# Patient Record
Sex: MALE | Race: WHITE | NOT HISPANIC OR LATINO
[De-identification: names, ages, dates, MRNs, and addresses within clinical notes are randomized per-mention and may not be internally consistent; named-entity substitution may affect disease eponyms.]

---

## 2021-05-27 ENCOUNTER — RECORDS - HEALTHEAST (OUTPATIENT)
Dept: ADMINISTRATIVE | Facility: OTHER | Age: 51
End: 2021-05-27

## 2021-05-27 ENCOUNTER — AMBULATORY - HEALTHEAST (OUTPATIENT)
Dept: SURGERY | Facility: HOSPITAL | Age: 51
End: 2021-05-27
Payer: COMMERCIAL

## 2021-05-27 ENCOUNTER — ANESTHESIA - HEALTHEAST (OUTPATIENT)
Dept: SURGERY | Facility: HOSPITAL | Age: 51
End: 2021-05-27

## 2021-05-27 ENCOUNTER — AMBULATORY - HEALTHEAST (OUTPATIENT)
Dept: LAB | Facility: CLINIC | Age: 51
End: 2021-05-27

## 2021-05-27 DIAGNOSIS — Z11.59 ENCOUNTER FOR SCREENING FOR OTHER VIRAL DISEASES: ICD-10-CM

## 2021-05-27 LAB
SARS-COV-2 PCR COMMENT: NORMAL
SARS-COV-2 RNA SPEC QL NAA+PROBE: NEGATIVE
SARS-COV-2 VIRUS SPECIMEN SOURCE: NORMAL

## 2021-05-27 ASSESSMENT — MIFFLIN-ST. JEOR: SCORE: 1713.08

## 2021-05-28 ENCOUNTER — RECORDS - HEALTHEAST (OUTPATIENT)
Dept: ADMINISTRATIVE | Facility: OTHER | Age: 51
End: 2021-05-28

## 2021-05-28 ENCOUNTER — COMMUNICATION - HEALTHEAST (OUTPATIENT)
Dept: SCHEDULING | Facility: CLINIC | Age: 51
End: 2021-05-28

## 2021-05-28 ENCOUNTER — SURGERY - HEALTHEAST (OUTPATIENT)
Dept: SURGERY | Facility: HOSPITAL | Age: 51
End: 2021-05-28
Payer: COMMERCIAL

## 2021-05-28 ASSESSMENT — MIFFLIN-ST. JEOR: SCORE: 1667.27

## 2021-05-30 ENCOUNTER — RECORDS - HEALTHEAST (OUTPATIENT)
Dept: ADMINISTRATIVE | Facility: CLINIC | Age: 51
End: 2021-05-30

## 2021-06-26 NOTE — ANESTHESIA PREPROCEDURE EVALUATION
Anesthesia Evaluation      Patient summary reviewed   No history of anesthetic complications     Airway   Mallampati: I  Neck ROM: full   Pulmonary - negative ROS and normal exam    breath sounds clear to auscultation                         Cardiovascular - negative ROS and normal exam  (-) murmur  Rhythm: regular  Rate: normal,    no murmur      Neuro/Psych - negative ROS     Endo/Other - negative ROS      GI/Hepatic/Renal - negative ROS           Dental - normal exam                        Anesthesia Plan  Planned anesthetic: MAC    ASA 1   Induction: intravenous   Anesthetic plan and risks discussed with: patient  Anesthesia plan special considerations: antiemetics,   Post-op plan: routine recovery

## 2021-06-26 NOTE — ANESTHESIA POSTPROCEDURE EVALUATION
Patient: Suhas Khan  Procedure(s):  INCARCERATED VENTRAL HERNIA  REPAIR  Anesthesia type: MAC    Patient location: Phase II Recovery  Last vitals:   Vitals Value Taken Time   /67 05/28/21 0830   Temp 36.5  C (97.7  F) 05/28/21 0759   Pulse 50 05/28/21 0836   Resp 16 05/28/21 0759   SpO2 99 % 05/28/21 0836   Vitals shown include unvalidated device data.  Post vital signs: stable  Level of consciousness: awake and responds to simple questions  Post-anesthesia pain: pain controlled  Post-anesthesia nausea and vomiting: no  Pulmonary: unassisted, return to baseline  Cardiovascular: stable and blood pressure at baseline  Hydration: adequate  Anesthetic events: no    QCDR Measures:  ASA# 11 - Kateryna-op Cardiac Arrest: ASA11B - Patient did NOT experience unanticipated cardiac arrest  ASA# 12 - Kateryna-op Mortality Rate: ASA12B - Patient did NOT die  ASA# 13 - PACU Re-Intubation Rate: NA - No ETT / LMA used for case  ASA# 10 - Composite Anes Safety: ASA10A - No serious adverse event    Additional Notes:

## 2021-06-26 NOTE — ANESTHESIA CARE TRANSFER NOTE
Last vitals:   Vitals:    05/28/21 0759   BP: 107/65   Pulse: 62   Resp: 16   Temp: 36.5  C (97.7  F)   SpO2: 96%     Patient's level of consciousness is drowsy  Spontaneous respirations: yes  Maintains airway independently: yes  Dentition unchanged: yes  Oropharynx: oropharynx clear of all foreign objects    QCDR Measures:  ASA# 20 - Surgical Safety Checklist: WHO surgical safety checklist completed prior to induction    PQRS# 430 - Adult PONV Prevention: 4558F - Pt received => 2 anti-emetic agents (different classes) preop & intraop  ASA# 8 - Peds PONV Prevention: NA - Not pediatric patient, not GA or 2 or more risk factors NOT present  PQRS# 424 - Kateryna-op Temp Management: 4559F - At least one body temp DOCUMENTED => 35.5C or 95.9F within required timeframe  PQRS# 426 - PACU Transfer Protocol: - Transfer of care checklist used  ASA# 14 - Acute Post-op Pain: ASA14B - Patient did NOT experience pain >= 7 out of 10

## 2021-07-04 NOTE — ADDENDUM NOTE
Addendum Note by Zuhair Adhikari CRNA at 5/28/2021 10:29 AM     Author: Zuhair Adhikari CRNA Service: -- Author Type: Nurse Anesthetist    Filed: 5/28/2021 10:29 AM Date of Service: 5/28/2021 10:29 AM Status: Signed    : Zuhair Adhikari CRNA (Nurse Anesthetist)       Addendum  created 05/28/21 1029 by Zuhair Adhikari CRNA    Flowsheet accepted, Intraprocedure Flowsheets edited

## 2021-07-06 VITALS — WEIGHT: 190 LBS | WEIGHT: 179.9 LBS | BODY MASS INDEX: 27.26 KG/M2 | HEIGHT: 70 IN

## 2022-01-12 VITALS — WEIGHT: 179.9 LBS | HEIGHT: 70 IN | BODY MASS INDEX: 25.75 KG/M2

## 2024-09-10 ENCOUNTER — LAB REQUISITION (OUTPATIENT)
Dept: LAB | Facility: CLINIC | Age: 54
End: 2024-09-10
Payer: COMMERCIAL

## 2024-09-10 PROCEDURE — 88321 CONSLTJ&REPRT SLD PREP ELSWR: CPT | Performed by: DERMATOLOGY

## 2024-11-26 LAB
PATH REPORT.COMMENTS IMP SPEC: NORMAL
PATH REPORT.COMMENTS IMP SPEC: NORMAL
PATH REPORT.FINAL DX SPEC: NORMAL
PATH REPORT.GROSS SPEC: NORMAL
PATH REPORT.MICROSCOPIC SPEC OTHER STN: NORMAL
PATH REPORT.RELEVANT HX SPEC: NORMAL
PATH REPORT.RELEVANT HX SPEC: NORMAL
PATH REPORT.SITE OF ORIGIN SPEC: NORMAL